# Patient Record
Sex: FEMALE | Race: BLACK OR AFRICAN AMERICAN | NOT HISPANIC OR LATINO | URBAN - METROPOLITAN AREA
[De-identification: names, ages, dates, MRNs, and addresses within clinical notes are randomized per-mention and may not be internally consistent; named-entity substitution may affect disease eponyms.]

---

## 2024-11-14 ENCOUNTER — EMERGENCY (EMERGENCY)
Facility: HOSPITAL | Age: 64
LOS: 0 days | Discharge: HOME | End: 2024-11-15
Attending: EMERGENCY MEDICINE
Payer: MEDICARE

## 2024-11-14 VITALS
SYSTOLIC BLOOD PRESSURE: 157 MMHG | WEIGHT: 179.9 LBS | TEMPERATURE: 97 F | HEIGHT: 66 IN | RESPIRATION RATE: 17 BRPM | DIASTOLIC BLOOD PRESSURE: 82 MMHG | HEART RATE: 93 BPM | OXYGEN SATURATION: 98 %

## 2024-11-14 DIAGNOSIS — E11.65 TYPE 2 DIABETES MELLITUS WITH HYPERGLYCEMIA: ICD-10-CM

## 2024-11-14 DIAGNOSIS — Z59.00 HOMELESSNESS UNSPECIFIED: ICD-10-CM

## 2024-11-14 DIAGNOSIS — Z79.84 LONG TERM (CURRENT) USE OF ORAL HYPOGLYCEMIC DRUGS: ICD-10-CM

## 2024-11-14 LAB
ACETONE SERPL-MCNC: NEGATIVE — SIGNIFICANT CHANGE UP
ALBUMIN SERPL ELPH-MCNC: 2.7 G/DL — LOW (ref 3.3–5)
ALP SERPL-CCNC: 202 U/L — HIGH (ref 40–120)
ALT FLD-CCNC: 49 U/L — SIGNIFICANT CHANGE UP (ref 12–78)
ANION GAP SERPL CALC-SCNC: 5 MMOL/L — SIGNIFICANT CHANGE UP (ref 5–17)
APTT BLD: 25.4 SEC — SIGNIFICANT CHANGE UP (ref 24.5–35.6)
AST SERPL-CCNC: 35 U/L — SIGNIFICANT CHANGE UP (ref 15–37)
BASOPHILS # BLD AUTO: 0.07 K/UL — SIGNIFICANT CHANGE UP (ref 0–0.2)
BASOPHILS NFR BLD AUTO: 0.8 % — SIGNIFICANT CHANGE UP (ref 0–2)
BILIRUB SERPL-MCNC: 0.4 MG/DL — SIGNIFICANT CHANGE UP (ref 0.2–1.2)
BUN SERPL-MCNC: 10 MG/DL — SIGNIFICANT CHANGE UP (ref 7–23)
CALCIUM SERPL-MCNC: 9.8 MG/DL — SIGNIFICANT CHANGE UP (ref 8.5–10.1)
CHLORIDE SERPL-SCNC: 99 MMOL/L — SIGNIFICANT CHANGE UP (ref 96–108)
CO2 SERPL-SCNC: 28 MMOL/L — SIGNIFICANT CHANGE UP (ref 22–31)
CREAT SERPL-MCNC: 0.74 MG/DL — SIGNIFICANT CHANGE UP (ref 0.5–1.3)
EGFR: 92 ML/MIN/1.73M2 — SIGNIFICANT CHANGE UP
EOSINOPHIL # BLD AUTO: 0.15 K/UL — SIGNIFICANT CHANGE UP (ref 0–0.5)
EOSINOPHIL NFR BLD AUTO: 1.6 % — SIGNIFICANT CHANGE UP (ref 0–6)
ETHANOL SERPL-MCNC: <10 MG/DL — SIGNIFICANT CHANGE UP (ref 0–10)
GAS PNL BLDV: SIGNIFICANT CHANGE UP
GLUCOSE SERPL-MCNC: 496 MG/DL — CRITICAL HIGH (ref 70–99)
HCT VFR BLD CALC: 37.9 % — SIGNIFICANT CHANGE UP (ref 34.5–45)
HGB BLD-MCNC: 12.8 G/DL — SIGNIFICANT CHANGE UP (ref 11.5–15.5)
IMM GRANULOCYTES NFR BLD AUTO: 0.4 % — SIGNIFICANT CHANGE UP (ref 0–0.9)
INR BLD: 0.87 RATIO — SIGNIFICANT CHANGE UP (ref 0.85–1.16)
LACTATE SERPL-SCNC: 1.1 MMOL/L — SIGNIFICANT CHANGE UP (ref 0.7–2)
LIDOCAIN IGE QN: 53 U/L — SIGNIFICANT CHANGE UP (ref 13–75)
LYMPHOCYTES # BLD AUTO: 1.95 K/UL — SIGNIFICANT CHANGE UP (ref 1–3.3)
LYMPHOCYTES # BLD AUTO: 21.4 % — SIGNIFICANT CHANGE UP (ref 13–44)
MCHC RBC-ENTMCNC: 28.2 PG — SIGNIFICANT CHANGE UP (ref 27–34)
MCHC RBC-ENTMCNC: 33.8 G/DL — SIGNIFICANT CHANGE UP (ref 32–36)
MCV RBC AUTO: 83.5 FL — SIGNIFICANT CHANGE UP (ref 80–100)
MONOCYTES # BLD AUTO: 0.59 K/UL — SIGNIFICANT CHANGE UP (ref 0–0.9)
MONOCYTES NFR BLD AUTO: 6.5 % — SIGNIFICANT CHANGE UP (ref 2–14)
NEUTROPHILS # BLD AUTO: 6.33 K/UL — SIGNIFICANT CHANGE UP (ref 1.8–7.4)
NEUTROPHILS NFR BLD AUTO: 69.3 % — SIGNIFICANT CHANGE UP (ref 43–77)
NRBC # BLD: 0 /100 WBCS — SIGNIFICANT CHANGE UP (ref 0–0)
PLATELET # BLD AUTO: 367 K/UL — SIGNIFICANT CHANGE UP (ref 150–400)
POTASSIUM SERPL-MCNC: 4 MMOL/L — SIGNIFICANT CHANGE UP (ref 3.5–5.3)
POTASSIUM SERPL-SCNC: 4 MMOL/L — SIGNIFICANT CHANGE UP (ref 3.5–5.3)
PROT SERPL-MCNC: 7 GM/DL — SIGNIFICANT CHANGE UP (ref 6–8.3)
PROTHROM AB SERPL-ACNC: 9.8 SEC — LOW (ref 9.9–13.4)
RBC # BLD: 4.54 M/UL — SIGNIFICANT CHANGE UP (ref 3.8–5.2)
RBC # FLD: 13.8 % — SIGNIFICANT CHANGE UP (ref 10.3–14.5)
SODIUM SERPL-SCNC: 132 MMOL/L — LOW (ref 135–145)
WBC # BLD: 9.13 K/UL — SIGNIFICANT CHANGE UP (ref 3.8–10.5)
WBC # FLD AUTO: 9.13 K/UL — SIGNIFICANT CHANGE UP (ref 3.8–10.5)

## 2024-11-14 RX ORDER — SODIUM CHLORIDE 9 MG/ML
2000 INJECTION, SOLUTION INTRAMUSCULAR; INTRAVENOUS; SUBCUTANEOUS ONCE
Refills: 0 | Status: COMPLETED | OUTPATIENT
Start: 2024-11-14 | End: 2024-11-14

## 2024-11-14 RX ORDER — INSULIN REG, HUM S-S BUFF 100/ML
7 VIAL (ML) INJECTION ONCE
Refills: 0 | Status: COMPLETED | OUTPATIENT
Start: 2024-11-14 | End: 2024-11-14

## 2024-11-14 RX ADMIN — Medication 7 UNIT(S): at 21:35

## 2024-11-14 SDOH — ECONOMIC STABILITY - HOUSING INSECURITY: HOMELESSNESS UNSPECIFIED: Z59.00

## 2024-11-14 NOTE — ED ADULT NURSE NOTE - TEMPLATE LIST FOR HEAD TO TOE ASSESSMENT
----- Message from Dali Foreman sent at 7/18/2023  9:06 AM CDT -----  Regarding: cancel  Contact: [phone]  Patient is requesting to cancel her procedure. She will call back to reschedule. Thanks   Would the patient rather a call back or a response via [facility]?  Call   Best Call Back Number:  [phone]  Additional Information:              
Attempted to contact patient. Left VM requesting call back.     
General

## 2024-11-14 NOTE — ED ADULT NURSE NOTE - ED STAT RN HANDOFF DETAILS
Handoff report received from BERTO Matos. Pt is stable at this time. Awaiting SW at 8 am for discharge

## 2024-11-14 NOTE — ED ADULT NURSE NOTE - OBJECTIVE STATEMENT
Patient presents to ED due to "not feeling well." Patient unable to elaborate on symptoms, stated "I don't know I just don't feel good, leave me alone." Patient presents with poor hygiene. Patient hyperglycemic. Denies alcohol ingestion. Denies pain. Denies difficulty breathing. Respirations even and unlabored.

## 2024-11-14 NOTE — ED ADULT NURSE REASSESSMENT NOTE - NS ED NURSE REASSESS COMMENT FT1
Pt refusing blood/lab work, IV catheter placement despite education and encouragement. Pt requesting instead for subQ insulin and ice water. MD made aware; primary RN Molina made aware.

## 2024-11-14 NOTE — ED PROVIDER NOTE - NSFOLLOWUPCLINICS_GEN_ALL_ED_FT
[facility] [place]  Internal Medicine  [address] [place] [address]  Phone: [phone]  Fax: [phone]  Follow Up Time: 7-10 Days

## 2024-11-14 NOTE — ED PROVIDER NOTE - PATIENT PORTAL LINK FT
You can access the FollowMyHealth Patient Portal offered by [facility] by registering at the following website: [email or web address]. By joining [employer]’s FollowMyHealth portal, you will also be able to view your health information using other applications (apps) compatible with our system.

## 2024-11-14 NOTE — ED ADULT NURSE REASSESSMENT NOTE - NS ED NURSE REASSESS COMMENT FT1
Patient agreed to blood work being drawn but continues to refuse IV despite extensive education on importance and encouragement. Patient also refusing to be placed on cardiac monitor despite education of abnormal EKG with MD Samaniego at bedside. MD Samaniego aware of patient refusal at this time.

## 2024-11-14 NOTE — ED PROVIDER NOTE - OBJECTIVE STATEMENT
63-year-old female with history of diabetes on metformin otherwise states that she lost all her meds presenting to ER due to hyperglycemia patient otherwise was going home on the bus and states that she was starting to feel unwell and stopped in a motel noted patient to have hyperglycemia and called EMS.  Patient denies any chest pain back pain denies any abdominal pain and otherwise has been having thirst and urinary frequency.

## 2024-11-14 NOTE — ED PROVIDER NOTE - CLINICAL SUMMARY MEDICAL DECISION MAKING FREE TEXT BOX
Patient initially uncooperative with medical evaluation only willing to receive subcutaneous insulin.  Later with noted EKG of questionable machine reading of ST BECKY otherwise without overt ST elevation or depression on EKG troponin noted negative no active chest pain.  Unlikely to have any significant cardiac event at this time.    Patient signed out for hyperglycemia otherwise awaiting improvement further of sugar.  May require social work in a.m.

## 2024-11-14 NOTE — ED PROVIDER NOTE - PROGRESS NOTE DETAILS
Debbie: Pt sister called ED to check in on pt, but confirms pt does not live w/ her and is homeless. Debbie: JULIANNA met w/ pt in ED, able to place pt into [facility]. Stable for d/c home. Given / instructed for close outpatient PCP f/u. Return signs / symptoms d/w pt. She understands / agrees w/ this plan.

## 2024-11-14 NOTE — ED ADULT NURSE NOTE - NSFALLHARMRISKINTERV_ED_ALL_ED

## 2024-11-14 NOTE — ED ADULT NURSE NOTE - NS ED NURSE RECORD ANOTHER VITAL SIGN
child is having problems. It's also a good idea to know your child's test results and keep a list of the medicines your child takes.  Where can you learn more?  Go to [email or web address]/[email or web address] and enter Z497 to learn more about \"Child's Well Visit, 2 to 4 Weeks: Care Instructions.\"  Current as of: October 24, 2023  Content Version: 14.2  © 2024 [employer].   Care instructions adapted under license by [employer]. If you have questions about a medical condition or this instruction, always ask your healthcare professional. [employer] disclaims any warranty or liability for your use of this information.       Yes

## 2024-11-15 VITALS
OXYGEN SATURATION: 96 % | RESPIRATION RATE: 18 BRPM | SYSTOLIC BLOOD PRESSURE: 135 MMHG | DIASTOLIC BLOOD PRESSURE: 72 MMHG | TEMPERATURE: 98 F | HEART RATE: 83 BPM

## 2024-11-15 NOTE — ED ADULT NURSE REASSESSMENT NOTE - NS ED NURSE REASSESS COMMENT FT1
Patient awake and alert, provided with hygienic care and linens were changed. Patient expressed desire to go home, however unable to provide address for medicaid taxi. Patient reports having been homeless and is currently staying in [place], but unclear whether domiciled as patient states she is staying at "an adult home" with no further information or exact address. Patient awake and alert, provided with hygienic care and linens were changed. Patient expressed desire to go home, however unable to provide address for medicaid taxi. Patient reports having been homeless and is currently staying in [place], but unclear whether domiciled as patient states she is staying at "an adult home" with no further information or exact address. Patient also reporting difficulty with obtaining daily medications from pharmacy. MD Brown aware. Social Work to be consulted.

## 2024-11-15 NOTE — ED ADULT NURSE REASSESSMENT NOTE - NS ED NURSE REASSESS COMMENT FT1
Patient provided home address of [facility] at [address] [place] [address], stating "If they don't want me there then I'll just go to my sister's house." Unable to confirm whether patient is resident of [facility] at this time. Attempted to call sister Loren, but no answer.

## 2024-11-15 NOTE — CHART NOTE - NSCHARTNOTEFT_GEN_A_CORE
JULIANNA met with pt in the ED. Pt is cleared for discharge. Pt is homeless but in the process of being placed in [facility] in [place]. JULIANNA spoke to pt's sister Loren @[phone] who confirmed that pt is in the process of being placed in the adult home and just needs to have a physical exam which has been arranged by Crisis Team Worker Anahi [phone]. JULIANNA left a voicemail for Anahi advising her of pt being in the ED and that we are sending her back to [place] to complete her admission requirements for the adult home. Pt is requesting to return to [place]. JULIANNA arranged a Medicaid taxi to transport pt to [facility]. Pt is in agreement with this plan.

## 2024-12-19 NOTE — ED ADULT NURSE NOTE - CAS ELECT INFOMATION PROVIDED
Detail Level: Detailed
Add 05698 Cpt? (Important Note: In 2017 The Use Of 31518 Is Being Tracked By Cms To Determine Future Global Period Reimbursement For Global Periods): yes
DC instructions